# Patient Record
Sex: FEMALE | Race: BLACK OR AFRICAN AMERICAN | NOT HISPANIC OR LATINO | URBAN - METROPOLITAN AREA
[De-identification: names, ages, dates, MRNs, and addresses within clinical notes are randomized per-mention and may not be internally consistent; named-entity substitution may affect disease eponyms.]

---

## 2018-05-01 ENCOUNTER — EMERGENCY (EMERGENCY)
Facility: HOSPITAL | Age: 63
LOS: 0 days | Discharge: HOME | End: 2018-05-01
Admitting: PHYSICIAN ASSISTANT

## 2018-05-01 VITALS
RESPIRATION RATE: 20 BRPM | OXYGEN SATURATION: 98 % | SYSTOLIC BLOOD PRESSURE: 148 MMHG | HEART RATE: 75 BPM | DIASTOLIC BLOOD PRESSURE: 85 MMHG | TEMPERATURE: 97 F

## 2018-05-01 DIAGNOSIS — Z91.018 ALLERGY TO OTHER FOODS: ICD-10-CM

## 2018-05-01 DIAGNOSIS — I10 ESSENTIAL (PRIMARY) HYPERTENSION: ICD-10-CM

## 2018-05-01 DIAGNOSIS — Z91.011 ALLERGY TO MILK PRODUCTS: ICD-10-CM

## 2018-05-01 DIAGNOSIS — L50.1 IDIOPATHIC URTICARIA: ICD-10-CM

## 2018-05-01 DIAGNOSIS — E78.5 HYPERLIPIDEMIA, UNSPECIFIED: ICD-10-CM

## 2018-05-01 DIAGNOSIS — R21 RASH AND OTHER NONSPECIFIC SKIN ERUPTION: ICD-10-CM

## 2018-05-01 RX ORDER — FAMOTIDINE 10 MG/ML
1 INJECTION INTRAVENOUS
Qty: 5 | Refills: 0 | OUTPATIENT
Start: 2018-05-01 | End: 2018-05-05

## 2018-05-01 RX ORDER — FAMOTIDINE 10 MG/ML
20 INJECTION INTRAVENOUS ONCE
Qty: 0 | Refills: 0 | Status: COMPLETED | OUTPATIENT
Start: 2018-05-01 | End: 2018-05-01

## 2018-05-01 RX ADMIN — Medication 60 MILLIGRAM(S): at 12:46

## 2018-05-01 RX ADMIN — FAMOTIDINE 20 MILLIGRAM(S): 10 INJECTION INTRAVENOUS at 12:45

## 2018-05-01 NOTE — ED PROVIDER NOTE - OBJECTIVE STATEMENT
64 yo female with h/o HTN, HLD, multiple food allergies including peanuts, wheat, barley, cucumbers, tomatoes presents to the ED with concern for allergic reaction. Patient with rash, pruritis and right sided tongue swelling. Patient took 50 benadryl prior to arrival with improvement of symptoms. Denies fever, chills, change in voice, excessive drooling, lip swelling, chest pain, sob, abdominal pain, N/V/D. Patient states hands started itchy last night after eating cookies. Then worse this morning after having her coffee with butter pecan creamers (which shes drank every day this week). Patient has an epi pen at home but states she did not need to use it. Admits shes had tongue swelling in the past from an allergic reaction.

## 2018-05-01 NOTE — ED PROVIDER NOTE - MEDICAL DECISION MAKING DETAILS
Patient here for allergic reaction, symptoms improved after steroids, benadryl and pepcid. Patient had appointment with allergist on Saturday. Understands return precautions.

## 2018-05-01 NOTE — ED PROVIDER NOTE - PROGRESS NOTE DETAILS
Patient feeling better significantly better. Understands return precautions. PAtient has appointment with allergist on Saturday

## 2018-05-01 NOTE — ED PROVIDER NOTE - NS ED ROS FT
Constitutional: no fever, chills   ENT: See HPI no URI sxs.  Cardiovascular: no chest pain, no sob  Respiratory: no cough, no shortness of breath,  Gastrointestinal: no nausea, vomiting or diarrhea. no abdominal pain.  Integumentary: + rash, + pruritis, + unilateral tongue swellings.   Neurological: no headache, no dizziness, no visual changes, no UE/LE weakness or paresthesias. no change in mental status. no neck pain or stiffness.   Allergic/Immunologic: + allergic reaction

## 2018-05-01 NOTE — ED PROVIDER NOTE - PHYSICAL EXAMINATION
GENERAL:  well appearing, non-toxic female in no acute distress  SKIN: skin warm, pink and dry. MMM. + urticarial rash on hands. no signs of infection.  ENT:  Airway intact. no lip or eyelid swelling. + mild right sided tongue swelling. no uvula edema. no change in voice. tolerating secretions. no sublingual swelling. Patent oropharynx without erythema or exudate. Uvula midline. TMs clear b/l.   PULM: CTAB. Normal respiratory effort. No respiratory distress. No wheezes, stridor, rales or rhonchi. No retractions  CV: RRR, no M/R/G.   ABD: Soft, non-tender, non-distended  NEURO: A+Ox3, no sensory/motor deficits

## 2018-05-01 NOTE — ED ADULT TRIAGE NOTE - CHIEF COMPLAINT QUOTE
c/o allergic reaction to creamer she had in her coffee this morning (butter pecan). Patient states has an allergy to peanuts. Took 2 Benadryls 1 hour ago. +relief

## 2025-07-09 NOTE — ED ADULT TRIAGE NOTE - NS ED NOTE AC HIGH RISK COUNTRIES
General- no acute distress Eyes- anicteric HENT- normocephalic, atraumatic head Neck- no lymphadenopathy Chest- normal breath sounds Heart- regular rate and rhythm Abdomen- soft, non tender, non distended, bowel sounds present Musculoskeletal- normal gait and station Skin- no rashes Neurologic- Alert and oriented x 3 Psychiatric- stable mood, appropriate affect
No